# Patient Record
Sex: FEMALE | Race: WHITE | NOT HISPANIC OR LATINO | URBAN - METROPOLITAN AREA
[De-identification: names, ages, dates, MRNs, and addresses within clinical notes are randomized per-mention and may not be internally consistent; named-entity substitution may affect disease eponyms.]

---
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
## 2023-10-15 NOTE — ED PEDIATRIC TRIAGE NOTE - NS_BH TRG QUESTION3_ED_ALL_ED
The patient is Stable - Low risk of patient condition declining or worsening    Shift Goals  Clinical Goals: Pain Management / Mobilization  Patient Goals: Pain Management / Mobilization    Progress made toward(s) clinical / shift goals:  pain managed    Patient is not progressing towards the following goals:       No

## 2024-04-13 NOTE — ED PEDIATRIC NURSE NOTE - READING LANGUAGE PREFERRED
"  [facility]        NAME: Ayesha Lockwood is a 68 y.o. female  : 1955    MRN: [id number]  DATE: 2024  TIME: 10:22 AM    Assessment and Plan   Sore throat [J02.9]  1. Sore throat  POCT rapid strepA    Covid/Flu- Office Collect Normal    Covid/Flu- Office Collect Normal    cephalexin (KEFLEX) 500 mg capsule    Throat culture        History and exam is consistent with a strep infection. Patient requested treatment with Keflex, which is an acceptable plan. Advised patient will send swab to lab for culture. She may follow results on MyChart. If positive, continue antibiotics. If negative, may discontinue antibiotics.     Patient Instructions     If Strep is suspected we may have obtained a throat swab from you today, which will be sent to our lab for culture. Our office will contact you once the culture results are available only if positive to begin appropriate antibiotic therapy. Alternatively, you may follow your results on MyChart. In the interim please start taking antibiotics as prescribed  For pain relief you may try:  Warm water and salt gargles  Chloraseptic spray  Cepacol lozenges  \"Throat Coat\" tea  Over-the-counter Tylenol/ibuprofen for pain and fever  Stay well hydrated and get plenty of rest  Follow up with your PCP in 3-5 days  Proceed to ER if symptoms worsen    If tests are performed, our office will contact you with results only if changes need to made to the care plan discussed with you at the visit. You can review your full results on [facility].    Chief Complaint     Chief Complaint   Patient presents with    Sore Throat     Started last night. No fever or chills. Runny nose, sneezing over a week, increased yellow mucous. Tenderness on right side of face and neck.  PND. Coughing. Taking flonase and inhaler.          History of Present Illness       68-year-old female presents the urgent care for reevaluation of sore throat, body aches, worsening sinus " congestion and pressure.  Patient notes symptoms have been present now for over a week.  Patient does have a history of asthma and notes a mild cough.  She does take Singulair and Allegra daily.  She complains of yellow mucus production.  The patient has not had any fevers.        Review of Systems   Review of Systems   Constitutional:  Negative for chills and fever.   HENT:  Positive for congestion, sinus pressure and sore throat. Negative for ear pain.    Eyes:  Negative for pain and visual disturbance.   Respiratory:  Positive for cough. Negative for shortness of breath.    Cardiovascular:  Negative for chest pain and palpitations.   Gastrointestinal:  Negative for abdominal pain, diarrhea, nausea and vomiting.   Genitourinary:  Negative for dysuria and hematuria.   Musculoskeletal:  Positive for myalgias. Negative for arthralgias and back pain.   Skin:  Negative for color change and rash.   Neurological:  Negative for dizziness, seizures, syncope, weakness, light-headedness and headaches.   All other systems reviewed and are negative.        Current Medications       Current Outpatient Medications:     acetaminophen (TYLENOL) 650 mg CR tablet, Take 1 tablet (650 mg total) by mouth every 8 (eight) hours as needed for mild pain, Disp: 30 tablet, Rfl: 0    albuterol (PROVENTIL HFA,VENTOLIN HFA) 90 mcg/act inhaler, INHALE 2 PUFFS EVERY 4-6 HOURS AS NEEDED, Disp: 108 g, Rfl: 0    ALPRAZolam (XANAX) 0.25 mg tablet, Take one tablet twice daily as needed for anxiety, Disp: 30 tablet, Rfl: 0    butalbital-acetaminophen-caffeine (FIORICET,ESGIC) -40 mg per tablet, Take 1 tablet by mouth every 4 (four) hours as needed for headaches, Disp: 90 tablet, Rfl: 0    cephalexin (KEFLEX) 500 mg capsule, Take 1 capsule (500 mg total) by mouth every 12 (twelve) hours for 7 days, Disp: 14 capsule, Rfl: 0    cycloSPORINE (RESTASIS) 0.05 % ophthalmic emulsion, Apply 1 drop to eye 2 (two) times a day, Disp: 0.4 mL, Rfl: 0     diazepam (VALIUM) 5 mg tablet, Take 1 tablet (5 mg total) by mouth every 8 (eight) hours as needed for anxiety, Disp: 30 tablet, Rfl: 0    diphenhydrAMINE (BENADRYL) 25 mg capsule, Take by mouth daily at bedtime as needed, Disp: , Rfl:     Elastic Bandages & Supports (Medical Compression Socks) MISC, Use daily, Disp: 5 each, Rfl: 2    fexofenadine (ALLEGRA) 180 MG tablet, Take 180 mg by mouth daily, Disp: , Rfl:     fluticasone (FLONASE) 50 mcg/act nasal spray, 2 sprays into each nostril daily, Disp: 48 mL, Rfl: 0    fluticasone (Flovent HFA) 220 mcg/act inhaler, Inhale 1 puff 2 (two) times a day Rinse mouth after use., Disp: 12 g, Rfl: 3    montelukast (SINGULAIR) 10 mg tablet, Take 1 tablet (10 mg total) by mouth every evening, Disp: 90 tablet, Rfl: 0    Multiple Vitamin (MULTIVITAMIN) capsule, Take 1 capsule by mouth daily, Disp: , Rfl:     pantoprazole (PROTONIX) 40 mg tablet, Take 1 tablet (40 mg total) by mouth 2 (two) times a day, Disp: 180 tablet, Rfl: 0    rivaroxaban (Xarelto) 10 mg tablet, Take 1 tablet (10 mg total) by mouth daily, Disp: 90 tablet, Rfl: 3    traMADol (Ultram) 50 mg tablet, Take 1 tablet (50 mg total) by mouth every 6 (six) hours as needed for moderate pain, Disp: 30 tablet, Rfl: 0    ZOLMitriptan (ZOMIG) 5 MG tablet, Take 1 tablet (5 mg total) by mouth once as needed for migraine for up to 1 dose, Disp: 20 tablet, Rfl: 3    calcium-vitamin D (OSCAL) 250-125 MG-UNIT per tablet, Take 1 tablet by mouth daily (Patient not taking: Reported on 10/5/2023), Disp: , Rfl:     ibuprofen (MOTRIN) 200 mg tablet, Take by mouth every 6 (six) hours as needed for mild pain (Patient not taking: Reported on 7/26/2023), Disp: , Rfl:     methocarbamol (ROBAXIN) 750 mg tablet, Take 1 tablet (750 mg total) by mouth every 6 (six) hours as needed for muscle spasms (Patient not taking: Reported on 3/27/2024), Disp: 30 tablet, Rfl: 0    rivaroxaban (XARELTO) 10 mg tablet, Take 1 tablet (10 mg total) by mouth daily  (Patient not taking: Reported on 3/27/2024), Disp: 90 tablet, Rfl: 0    Current Allergies     Allergies as of 2024 - Reviewed 2024   Allergen Reaction Noted    Naproxen Shortness Of Breath 2016    Prednisone Facial Swelling 2023    Iv contrast [iodinated contrast media] Itching 10/25/2019    Seasonal ic [cholestatin]  2019    Phentermine Palpitations 2020            The following portions of the patient's history were reviewed and updated as appropriate: allergies, current medications, past family history, past medical history, past social history, past surgical history and problem list.     Past Medical History:   Diagnosis Date    Acid reflux     Allergic rhinitis     Last Assessed: 2017    Anesthesia complication     HYPOTENSION    Arthritis     Asthma     Bigeminy     history    DVT (deep venous thrombosis) ([facility]) 10/23/2018    DVT (deep venous thrombosis) ([facility])     DVT of leg (deep venous thrombosis) ([facility])     left    Fatty liver     Female pelvic pain     GERD (gastroesophageal reflux disease)     Hyperlipidemia     high trigrlycerides    Intermittent palpitations     Interstitial cystitis     Irregular heart beat     Migraines     Neck pain     Obesity (BMI 30.0-34.9)     Palpitations     Pes planus     unspecified laterality; Last Assessed: 2014    Thyroid nodule     Tinnitus     Trigeminy     history    Wears glasses     Wears reading eyeglasses        Past Surgical History:   Procedure Laterality Date    APPENDECTOMY      BREAST BIOPSY Left 2019    CATARACT EXTRACTION Bilateral      SECTION      X 2    CHOLECYSTECTOMY      COLONOSCOPY      DILATION AND CURETTAGE OF UTERUS      HYSTERECTOMY      total    JOINT REPLACEMENT      left TKR    KNEE SURGERY Left     X 3    [place] ESOPHAGOGASTRODUODENOSCOPY TRANSORAL DIAGNOSTIC N/A 2016    Procedure: ESOPHAGOGASTRODUODENOSCOPY (EGD);  Surgeon: Jason Martinez MD;  Location: [facility];  Service:  Gastroenterology    ME EXCISION GANGLION WRIST DORSAL/VOLAR PRIMARY Right 2/21/2023    Procedure: Right long finger mucoid cyst excision distal interphalangeal joint;  Surgeon: Олег Her MD;  Location: [facility];  Service: Orthopedics    ME LAPS TOTAL HYSTERECT 250 GM/< W/RMVL TUBE/OVARY Bilateral 07/08/2016    Procedure: HYSTERECTOMY LAPAROSCOPIC TOTAL ,BSO;  Surgeon: Hermes Moran MD;  Location: [facility];  Service: Gynecology Oncology    REPLACEMENT TOTAL KNEE  2014    US GUIDED BREAST BIOPSY LEFT COMPLETE Left 02/06/2019    WISDOM TOOTH EXTRACTION         Family History   Problem Relation Age of Onset    Endometrial cancer Mother 68    Migraines Mother     Obesity Father     Diabetes Father     Heart disease Father     Heart attack Father     Prostate cancer Father     Hypertension Father     Diabetes Brother     Thyroid cancer Brother         medullary    Diabetes type II Brother     Growth hormone deficiency Daughter     No Known Problems Daughter     Alzheimer's disease Maternal Aunt     Thyroid cancer Maternal Aunt     Breast cancer Maternal Aunt 65    Thyroid cancer Maternal Aunt 85    No Known Problems Maternal Aunt     No Known Problems Maternal Aunt     No Known Problems Maternal Aunt     No Known Problems Maternal Aunt     Other Maternal Uncle         Brain Tumor    Alzheimer's disease Maternal Uncle     Diabetes Paternal Aunt     Hashimoto's thyroiditis Paternal Aunt         Total Thyroidectomy    Hypertension Paternal Aunt     No Known Problems Paternal Aunt     Stroke Paternal Uncle     No Known Problems Maternal Grandmother     No Known Problems Paternal Grandmother     Cancer Family     Colon cancer Neg Hx     Cervical cancer Neg Hx     Ovarian cancer Neg Hx     Uterine cancer Neg Hx          Medications have been verified.        Objective   /72   Pulse 83   Temp 98.1 °F (36.7 °C)   Resp 16   Wt 89.1 kg (196 lb 6 oz)   LMP  (LMP Unknown)   SpO2 98%   BMI 34.79 kg/m²         Physical Exam     Physical Exam  Vitals and nursing note reviewed.   Constitutional:       General: She is not in acute distress.     Appearance: Normal appearance. She is well-developed. She is not ill-appearing.   HENT:      Head: Normocephalic and atraumatic.      Right Ear: Tympanic membrane normal.      Left Ear: Tympanic membrane normal.      Nose: Nose normal.      Mouth/Throat:      Mouth: Mucous membranes are moist.      Pharynx: Oropharynx is clear. Posterior oropharyngeal erythema present. No oropharyngeal exudate.      Tonsils: No tonsillar exudate.   Eyes:      Extraocular Movements: Extraocular movements intact.      Pupils: Pupils are equal, round, and reactive to light.   Cardiovascular:      Rate and Rhythm: Normal rate and regular rhythm.      Pulses: Normal pulses.      Heart sounds: Normal heart sounds. No murmur heard.  Pulmonary:      Effort: Pulmonary effort is normal. No respiratory distress.      Breath sounds: Normal breath sounds. No wheezing or rhonchi.   Abdominal:      Palpations: Abdomen is soft.      Tenderness: There is no abdominal tenderness.   Musculoskeletal:         General: Normal range of motion.      Cervical back: Normal range of motion.   Lymphadenopathy:      Cervical: Cervical adenopathy present.   Skin:     General: Skin is warm and dry.      Capillary Refill: Capillary refill takes less than 2 seconds.   Neurological:      General: No focal deficit present.      Mental Status: She is alert and oriented to person, place, and time.   Psychiatric:         Mood and Affect: Mood normal.         Behavior: Behavior normal.                    English